# Patient Record
Sex: MALE | Race: BLACK OR AFRICAN AMERICAN | NOT HISPANIC OR LATINO | Employment: FULL TIME | URBAN - METROPOLITAN AREA
[De-identification: names, ages, dates, MRNs, and addresses within clinical notes are randomized per-mention and may not be internally consistent; named-entity substitution may affect disease eponyms.]

---

## 2023-04-27 ENCOUNTER — HOSPITAL ENCOUNTER (OUTPATIENT)
Dept: DATA CONVERSION | Facility: HOSPITAL | Age: 53
End: 2023-04-27
Attending: ORTHOPAEDIC SURGERY | Admitting: ORTHOPAEDIC SURGERY

## 2023-04-27 DIAGNOSIS — G56.03 CARPAL TUNNEL SYNDROME, BILATERAL UPPER LIMBS: ICD-10-CM

## 2023-04-27 DIAGNOSIS — F17.200 NICOTINE DEPENDENCE, UNSPECIFIED, UNCOMPLICATED: ICD-10-CM

## 2023-04-27 DIAGNOSIS — G56.02 CARPAL TUNNEL SYNDROME, LEFT UPPER LIMB: ICD-10-CM

## 2023-09-07 VITALS
RESPIRATION RATE: 16 BRPM | SYSTOLIC BLOOD PRESSURE: 104 MMHG | DIASTOLIC BLOOD PRESSURE: 58 MMHG | HEART RATE: 63 BPM | TEMPERATURE: 97.7 F

## 2023-09-14 NOTE — H&P
History of Present Illness:   History Present Illness:  Reason for surgery: Bilateral carpal tunnel syndrome   HPI:    51 yo RHD AAM with numb/tingling bilateral hands in radial digits, worse on the left.  Has failed to resolve with conservative mgmt. Here for elective carpal tunnel  release on the left and cortisone injection on the right.    Allergies:        Allergies:  ·  No Known Allergies :     Home Medication Review:   Home Medications Reviewed: no     Impression/Procedure:   ·  Impression and Planned Procedure: Left carpal tunnel release  ;  Cortisone injection to right carpal tunnel       ERAS (Enhanced Recovery After Surgery):  ·  ERAS Patient: no     Review of Systems:   Review of Systems:  Constitutional: NEGATIVE: Chills     Eyes: NEGATIVE: Diploplia     ENMT: NEGATIVE: Nasal Congestion     Respiratory: NEGATIVE: Hemoptysis     Cardiac: NEGATIVE: Dyspnea on Exertion     Gastrointestinal: NEGATIVE: Vomiting     Genitourinary: NEGATIVE: Flank Pain     Musculoskeletal: POSITIVE: Pain, Stiffness; NEGATIVE:  Decreased ROM, Swelling, Weakness     Neurological: NEGATIVE: Confusion     Psychiatric: NEGATIVE: Anxiety     Skin: NEGATIVE: Rash     Endocrine: NEGATIVE: Cold Intolerance     Hematologic/Lymph: NEGATIVE: Easy Bleeding     Allergic/Immunologic: NEGATIVE: Anaphylaxis         Vital Signs:  Temperature C: 36.5 degrees C   Temperature F: 97.7 degrees F   Heart Rate: 63 beats per minute   Respiratory Rate: 16 breath per minute   Blood Pressure Systolic: 104 mm/Hg   Blood Pressure Diastolic: 58 mm/Hg     Physical Exam by System:    Constitutional: WD WN AAM in NAD ;  alert and cooperative   Eyes: PERRL, EOMI, clear sclera   ENMT: mucous membranes moist, no apparent injury,  no lesions seen   Head/Neck: Neck supple, no apparent injury, trachea  midline   Respiratory/Thorax: unlabored breathing   Cardiovascular: RRR by peripheral palpation   Musculoskeletal: intact motors   Neurological: SILT M/R/U ; +  Phalen and Durkan at  both wrists, more brisk on the left   Skin: intact, no rashes     Consent:   COVID-19 Consent:  ·  COVID-19 Risk Consent Surgeon has reviewed key risks related to the risk of sharla COVID-19 and if they contract COVID-19 what the risks are.       Electronic Signatures:  Vladimir Alanis)  (Signed 27-Apr-2023 13:21)   Authored: History of Present Illness, Allergies, Home  Medication Review, Impression/Procedure, ERAS, Review of Systems, Physical Exam, Consent, Note Completion      Last Updated: 27-Apr-2023 13:21 by Vladimir Alanis)

## 2023-10-02 NOTE — OP NOTE
Post Operative Note:     PreOp Diagnosis: Bilateral carpal tunnel syndrome   Post-Procedure Diagnosis: Same   Procedure: 1.  Left carpal tunnel release  2.  Right carpal tunnel cortisone injection   Surgeon: Zeke   Resident/Fellow/Other Assistant: None   Anesthesia: MAC sedation plus local   I.V. Fluids: 500 cc LR   Estimated Blood Loss (mL): 2   Blood Replacement: None   Specimen: no   Complications: None immediate   Findings: Tight, thick TCL   Patient Returned To/Condition: PACU in good   Tourniquet Times: 5 minutes at 250 mmHg     Operative Report Dictated:  Dictation: not applicable - note contains Operative  Report   Operative Report:    INDICATION:    Patient is a 52-year-old right-hand dominant man with numbness and tingling in the median nerve distribution of both hands, worse on the left, that has failed to respond to conservative measures.  He is here for elective left carpal tunnel release and  right carpal tunnel cortisone injection while under anesthesia.  I reminded him of surgical risks of infection; scarring; damage to nerves, tendons, or vessels; stiffness; wound healing problems; failure to relieve symptoms; recurrent symptoms; and pillar  pain.  I also reviewed cortisone risks of infection, hypopigmentation, and fat atrophy along with the expected transient rise in blood glucose measurement.  He wished to proceed with both portions.     NARRATIVE:    Following identification of the patient and confirmation of correct sites of surgery and signed operative consent, he was brought to the operating room and a hand table affixed to the cart.  A light MAC sedation was administered by Anesthesia along with  IV antibiotic dose.  A pneumatic tourniquet was placed high on the left arm, and the limb was prepped from fingertip to cuff with chlorhexidine, and draped free in the usual sterile fashion.  10 mL of a mix of 0.5% Marcaine and 1% lidocaine plain was  instilled to the planned incision area.  The limb was exsanguinated with an Esmarch, and the tourniquet inflated.    A standard 2 cm mini-open carpal tunnel incision was made and taken bluntly down to the palmar fascia, which was divided in line with its fibers.  Further careful blunt dissection revealed the distal edge of the transverse carpal ligament.  A small metal  skid was placed underneath to protect the median nerve, and the ligament was carefully, sharply, sequentially divided under direct vision.  This was done with scissors.  There was good refill of the longitudinal vessel.  The tourniquet was deflated, and  pink color rapidly returned to all nailbeds.  Meticulous hemostasis was achieved with bipolar.  After final irrigation, skin was closed with 4-0 vicryl subcutaneous and 4-0 Monocryl skin stitch, and a soft dressing applied.    Attention was then turned to the right carpal tunnel.  30 mg of Kenalog was injected after sterile alcohol prep with a 25-gauge needle via standard proximal approach.  A Band-Aid was applied.    Patient was awakened and transferred to Recovery in stable condition.        Attestation:   Note Completion:  Attending Attestation I performed the procedure without a resident         Electronic Signatures:  Vladimir Alanis)  (Signed 27-Apr-2023 15:47)   Authored: Post Operative Note, Note Completion      Last Updated: 27-Apr-2023 15:47 by Vladimir Alanis)

## 2024-08-28 ENCOUNTER — HOSPITAL ENCOUNTER (OUTPATIENT)
Dept: RADIOLOGY | Facility: CLINIC | Age: 54
Discharge: HOME | End: 2024-08-28
Payer: COMMERCIAL

## 2024-08-28 ENCOUNTER — OFFICE VISIT (OUTPATIENT)
Dept: ORTHOPEDIC SURGERY | Facility: CLINIC | Age: 54
End: 2024-08-28
Payer: COMMERCIAL

## 2024-08-28 DIAGNOSIS — M25.521 RIGHT ELBOW PAIN: ICD-10-CM

## 2024-08-28 DIAGNOSIS — M70.21 OLECRANON BURSITIS OF RIGHT ELBOW: Primary | ICD-10-CM

## 2024-08-28 PROCEDURE — 73080 X-RAY EXAM OF ELBOW: CPT | Mod: RT

## 2024-08-28 PROCEDURE — 99213 OFFICE O/P EST LOW 20 MIN: CPT | Performed by: STUDENT IN AN ORGANIZED HEALTH CARE EDUCATION/TRAINING PROGRAM

## 2024-08-28 NOTE — PROGRESS NOTES
CHIEF COMPLAINT: No chief complaint on file.    History: 54 y.o. male presents to the office today for evaluation of his right elbow.  The patient is right-hand-dominant.  He works at [employer].  He states that about 1 to 2 months ago he hit his right elbow.  About a month ago he noticed that there was swelling in the back of the right elbow.  This does not hurt at all.  Not really having any pain.  Has not had any treatments yet.    Past medical history, past surgical history, medications, allergies, family history, social history, and review of systems were reviewed today.    A 12 point review of systems was negative other than as stated in the HPI.    Past Medical History:   Diagnosis Date    Carpal tunnel syndrome, unspecified upper limb 05/24/2015    Carpal tunnel syndrome    Combined forms of age-related cataract, right eye 02/21/2017    Combined form of age-related cataract, right eye    Hemorrhage of anus and rectum 05/18/2017    Rectal bleeding    Lateral epicondylitis, left elbow 02/11/2015    Left tennis elbow    Melena 05/18/2017    Blood in stool    Other conditions influencing health status 05/18/2017    PSC (posterior subcapsular cataract), right    Personal history of colonic polyps 09/13/2019    History of colon polyps    Personal history of other endocrine, nutritional and metabolic disease 02/11/2015    History of obesity    Preglaucoma, unspecified, unspecified eye 06/26/2018    Borderline glaucoma (glaucoma suspect)    Radiculopathy, cervical region 05/18/2017    Cervical radiculopathy    Unspecified cataract 02/02/2016    Cataract of right eye    Unspecified cataract 02/02/2016    Cataract of right eye    Unspecified corneal scar and opacity 06/26/2018    Corneal scar, right eye        Not on File     Past Surgical History:   Procedure Laterality Date    ANTERIOR CRUCIATE LIGAMENT REPAIR  07/05/2016    Primary Repair Of Knee Ligament Cruciate Anterior        No family history on file.     Social  "History     Socioeconomic History    Marital status:      Spouse name: Not on file    Number of children: Not on file    Years of education: Not on file    Highest education level: Not on file   Occupational History    Not on file   Tobacco Use    Smoking status: Not on file    Smokeless tobacco: Not on file   Substance and Sexual Activity    Alcohol use: Not on file    Drug use: Not on file    Sexual activity: Not on file   Other Topics Concern    Not on file   Social History Narrative    Not on file     Social Determinants of Health     Financial Resource Strain: Not on file   Food Insecurity: Not on file   Transportation Needs: Not on file   Physical Activity: Not on file   Stress: Not on file   Social Connections: Not on file   Intimate Partner Violence: Not on file   Housing Stability: Not on file        CURRENT MEDICATIONS:   No current outpatient medications on file.     No current facility-administered medications for this visit.       Physical Examination:      3/2/2023    11:20 AM 4/27/2023    12:18 PM   Vitals   Systolic 121 104   Diastolic 67 58   Heart Rate 62 63   Temp  36.5 °C (97.7 °F)   Resp  16   Height (in) 1.854 m (6' 1\")    Weight (lb) 185.56    BMI 24.48 kg/m2    BSA (m2) 2.08 m2       There is no height or weight on file to calculate BMI.    Well-appearing, appears stated age, pleasant and cooperative, appropriate mood and behavior. Height and weight reviewed. Alert and oriented x3.  Auditory function intact.  No acute distress.  Intact ocular function, FREDDY, EOMI. Breathing is unlabored .  There is no evidence of jugular venous distension. Skin appearance is normal without evidence of rash or other lesions. 2+ radial pulses bilaterally, fingers pink and wwp, good capillary refill, no pitting edema. No appreciable lymphadenopathy in bilateral upper extremities. SILT throughout both upper extremities, median/radial/ulnar/musculocutaneous/axillary nerve motor and sensory intact (except " for abnormalities noted in focused musculoskeletal exam section below).     Neck exam: Full range of motion of the neck in flexion/extension and rotational movements. No significant areas of tenderness to palpation in the neck.    On exam of bilateral upper extremities, effusion over the right posterior elbow without significant erythema or drainage.  Full range of motion of the bilateral elbows with arc of motion 0-1 50.  Full strength in flexion and extension.  Full pronation and supination.  The effusion is not tender to palpation.    Imaging: Radiographs of the right elbow performed today.  Personally interpreted by myself.  Preserved joint spaces.  No acute fractures noted.    Assessment: Right olecranon bursitis    Plan: On discussion with the patient the treatment options and diagnosis.  Discussed that this is a self-limiting diagnosis.  Discussed that the vast majority of cases of olecranon bursitis self resolve with time.  May take a year to fully resolve.  Recommended a compressive wrap and avoiding pressure on the area.  Told him that he should not let anybody drain the area as this may cause a draining sinus tract and seeded infection.  Told him that there are no signs of infection when I look at his elbow today.  Compressive wrap was placed.  Patient to follow-up as needed.    Dragon software was used to dictate this note, please be aware that minor errors in transcription may be present.    Errol Herr MD    Shoulder/Elbow Surgery  [id number]

## 2024-09-03 ENCOUNTER — APPOINTMENT (OUTPATIENT)
Dept: ORTHOPEDIC SURGERY | Facility: CLINIC | Age: 54
End: 2024-09-03
Payer: COMMERCIAL